# Patient Record
Sex: FEMALE | Race: WHITE | NOT HISPANIC OR LATINO | Employment: OTHER | URBAN - METROPOLITAN AREA
[De-identification: names, ages, dates, MRNs, and addresses within clinical notes are randomized per-mention and may not be internally consistent; named-entity substitution may affect disease eponyms.]

---

## 2019-08-29 ENCOUNTER — TELEPHONE (OUTPATIENT)
Dept: ENDOCRINOLOGY | Facility: CLINIC | Age: 69
End: 2019-08-29

## 2019-08-29 NOTE — TELEPHONE ENCOUNTER
S/w pt. Advised of no referral placed to endo and to check w/ insurance company to see if referral is needed or not. Verbalized understanding.

## 2019-08-29 NOTE — TELEPHONE ENCOUNTER
----- Message from Marilia Farr sent at 8/29/2019  2:19 PM CDT -----  Contact: patient  Type: Needs Medical Advice    Who Called:  patient  Symptoms (please be specific):  femi  How long has patient had these symptoms:  femi  Pharmacy name and phone #:  femi  Best Call Back Number: [phone]   Additional Information: Patient states she has apt on 9/5 @ 11 and says her insurance company does not need referral from Dr. Cedeno's office ,but patient wants it checked out before her apt. By the  office before she comes to apt. To insure everything is in place because she needs this pt.  Please call to advise. Thanks!

## 2019-09-05 ENCOUNTER — OFFICE VISIT (OUTPATIENT)
Dept: ENDOCRINOLOGY | Facility: CLINIC | Age: 69
End: 2019-09-05
Payer: MEDICARE

## 2019-09-05 VITALS
HEIGHT: 63 IN | BODY MASS INDEX: 18.75 KG/M2 | DIASTOLIC BLOOD PRESSURE: 98 MMHG | HEART RATE: 114 BPM | SYSTOLIC BLOOD PRESSURE: 146 MMHG | WEIGHT: 105.81 LBS

## 2019-09-05 DIAGNOSIS — I10 BENIGN ESSENTIAL HTN: Primary | ICD-10-CM

## 2019-09-05 PROCEDURE — 99203 OFFICE O/P NEW LOW 30 MIN: CPT | Mod: S$GLB,,, | Performed by: INTERNAL MEDICINE

## 2019-09-05 PROCEDURE — 3080F DIAST BP >= 90 MM HG: CPT | Mod: CPTII,S$GLB,, | Performed by: INTERNAL MEDICINE

## 2019-09-05 PROCEDURE — 3077F PR MOST RECENT SYSTOLIC BLOOD PRESSURE >= 140 MM HG: ICD-10-PCS | Mod: CPTII,S$GLB,, | Performed by: INTERNAL MEDICINE

## 2019-09-05 PROCEDURE — 99999 PR PBB SHADOW E&M-EST. PATIENT-LVL III: CPT | Mod: PBBFAC,,, | Performed by: INTERNAL MEDICINE

## 2019-09-05 PROCEDURE — 99999 PR PBB SHADOW E&M-EST. PATIENT-LVL III: ICD-10-PCS | Mod: PBBFAC,,, | Performed by: INTERNAL MEDICINE

## 2019-09-05 PROCEDURE — 1101F PR PT FALLS ASSESS DOC 0-1 FALLS W/OUT INJ PAST YR: ICD-10-PCS | Mod: CPTII,S$GLB,, | Performed by: INTERNAL MEDICINE

## 2019-09-05 PROCEDURE — 3080F PR MOST RECENT DIASTOLIC BLOOD PRESSURE >= 90 MM HG: ICD-10-PCS | Mod: CPTII,S$GLB,, | Performed by: INTERNAL MEDICINE

## 2019-09-05 PROCEDURE — 99203 PR OFFICE/OUTPT VISIT, NEW, LEVL III, 30-44 MIN: ICD-10-PCS | Mod: S$GLB,,, | Performed by: INTERNAL MEDICINE

## 2019-09-05 PROCEDURE — 3077F SYST BP >= 140 MM HG: CPT | Mod: CPTII,S$GLB,, | Performed by: INTERNAL MEDICINE

## 2019-09-05 PROCEDURE — 1101F PT FALLS ASSESS-DOCD LE1/YR: CPT | Mod: CPTII,S$GLB,, | Performed by: INTERNAL MEDICINE

## 2019-09-05 NOTE — PROGRESS NOTES
CHIEF COMPLAINT: HTN  69 year old being seen as a new patient. States that had HTN started in 50's. On ARB. States that she has episodes of palpitations- Seeing Cardiology. Did have a low K 5/2018. Palpitations occur sometimes daily or weekly. Occasionally does get diaphoretic. No assoc anxiety. States has gotten BP spike with these episodes. Had an episode today of palpitations.       PAST MEDICAL HISTORY/PAST SURGICAL HISTORY:  Reviewed in [facility]    SOCIAL HISTORY: No T/A    FAMILY HISTORY:  No DM. No known thyroid disease    MEDICATIONS/ALLERGIES: The patient's MedCard has been updated and reviewed.      ROS:   Constitutional: No recent significant weight change  Eyes: No recent visual changes  ENT: No dysphagia  Cardiovascular: Denies current anginal symptoms  Respiratory: Denies current respiratory difficulty  Gastrointestinal: Denies recent bowel disturbances  GenitoUrinary - No dysuria  Skin: No new skin rash  Neurologic: No focal neurologic complaints  Remainder ROS negative        PE:    GENERAL: Well developed, well nourished.  PSYCH:  appropriate mood and affect  EYES:  PERRL, EOM intact.  ENT: Nares patent, oropharynx clear, mucosa pink,   NECK: Supple, trachea midline, no palpable thyroid nodules.   CHEST: Resp even and unlabored, CTA bilateral.  CARDIAC: RRR, S1, S2 heard, no murmurs, rubs, S3, or S4  ABDOMEN: Soft, non-tender, non-distended;  No organomegaly  VASCULAR:  DP pulses +2/4 bilaterally, no edema  NEURO: Gait steady, CN II-VII grossly intact  SKIN: No areas of breakdown, no acanthosis nigracans.    LABS   Results for SAMIRA GUARDADO (MRN [id number]) as of 9/5/2019 11:19   Ref. Range 4/20/2019 12:46   Sodium Latest Ref Range: 136 - 145 mmol/L 141   Potassium Latest Ref Range: 3.5 - 5.1 mmol/L 3.6   Chloride Latest Ref Range: 95 - 110 mmol/L 106   CO2 Latest Ref Range: 22 - 31 mmol/L 29   Anion Gap Latest Ref Range: 8 - 16 mmol/L 6 (L)   BUN, Bld Latest Ref Range: 7 - 18 mg/dL 13   Creatinine Latest  Ref Range: 0.50 - 1.40 mg/dL 0.75   eGFR if non African American Latest Ref Range: >60 mL/min/1.73 m^2 >60   eGFR if  Latest Ref Range: >60 mL/min/1.73 m^2 >60   Glucose Latest Ref Range: 70 - 110 mg/dL 104   Calcium Latest Ref Range: 8.4 - 10.2 mg/dL 10.3 (H)   Magnesium Latest Ref Range: 1.6 - 2.6 mg/dL 2.2   Alkaline Phosphatase Latest Ref Range: 38 - 145 U/L 79   PROTEIN TOTAL Latest Ref Range: 6.0 - 8.4 g/dL 7.7   Albumin Latest Ref Range: 3.5 - 5.2 g/dL 4.6   BILIRUBIN TOTAL Latest Ref Range: 0.2 - 1.3 mg/dL 0.4   AST Latest Ref Range: 14 - 36 U/L 43 (H)   ALT Latest Ref Range: 10 - 44 U/L 40       Results for SAMIRA GUARDADO (MRN [id number]) as of 9/5/2019 11:19   Ref. Range 6/27/2019 13:12 6/28/2019 13:39 7/1/2019 07:10   Chromogranin A Latest Ref Range: 25 - 140 ng/mL 137     Metanephrines, 24H Ur Latest Ref Range: 90 - 315 mcg/24 h   90   Normetanephrine, 24H Ur Latest Ref Range: 122 - 676 mcg/24 h   248       ASSESSMENT/PLAN:  1. HTN- has had low K in th epast. Will do w/u as seen below. R/O hyperaldo and pheo.     FOLLOWUP ([facility])  8 AM aldosterone, Renin, CMP, metanephrines

## 2019-09-05 NOTE — LETTER
September 12, 2019      Tasha Foster MD  [address]  [address]  [place] [address]           [facility]  [address] [facility] [address] [place] [address]  Phone: [phone]  Fax: [phone]          Patient: Antonieta Hager   MR Number: [id number]   YOB: 1950   Date of Visit: 9/5/2019       Dear Dr. Tasha Foster:    Thank you for referring Antonieta Hager to me for evaluation. Attached you will find relevant portions of my assessment and plan of care.    If you have questions, please do not hesitate to call me. I look forward to following Antonieta Hager along with you.    Sincerely,    DO Erum Fosterosure  CC:  No Recipients    If you would like to receive this communication electronically, please contact [email or web address] or [phone] to request more information on [employer] access.    For providers and/or their staff who would like to refer a patient to [facility], please contact us through our one-stop-shop provider referral line, [facility], at [phone].    If you feel you have received this communication in error or would no longer like to receive these types of communications, please e-mail [email or web address]

## 2019-09-11 LAB
ALBUMIN SERPL-MCNC: 4.2 G/DL (ref 3.6–5.1)
ALBUMIN/GLOB SERPL: 1.8 (CALC) (ref 1–2.5)
ALDOST SERPL-MCNC: 5 NG/DL
ALP SERPL-CCNC: 73 U/L (ref 33–130)
ALT SERPL-CCNC: 17 U/L (ref 6–29)
AST SERPL-CCNC: 21 U/L (ref 10–35)
BILIRUB SERPL-MCNC: 0.4 MG/DL (ref 0.2–1.2)
BUN SERPL-MCNC: 12 MG/DL (ref 7–25)
BUN/CREAT SERPL: NORMAL (CALC) (ref 6–22)
CALCIUM SERPL-MCNC: 10 MG/DL (ref 8.6–10.4)
CHLORIDE SERPL-SCNC: 107 MMOL/L (ref 98–110)
CO2 SERPL-SCNC: 27 MMOL/L (ref 20–32)
CREAT SERPL-MCNC: 0.77 MG/DL (ref 0.5–0.99)
GFRSERPLBLD MDRD-ARVRAT: 79 ML/MIN/1.73M2
GLOBULIN SER CALC-MCNC: 2.3 G/DL (CALC) (ref 1.9–3.7)
GLUCOSE SERPL-MCNC: 94 MG/DL (ref 65–99)
METANEPH FREE SERPL-MCNC: 34 PG/ML
METANEPHS SERPL-MCNC: 168 PG/ML
NORMETANEPH FREE SERPL-MCNC: 134 PG/ML
POTASSIUM SERPL-SCNC: 4.8 MMOL/L (ref 3.5–5.3)
PROT SERPL-MCNC: 6.5 G/DL (ref 6.1–8.1)
RENIN PLAS-CCNC: 4.01 NG/ML/H (ref 0.25–5.82)
SODIUM SERPL-SCNC: 141 MMOL/L (ref 135–146)

## 2019-09-12 ENCOUNTER — TELEPHONE (OUTPATIENT)
Dept: ENDOCRINOLOGY | Facility: CLINIC | Age: 69
End: 2019-09-12

## 2019-09-12 DIAGNOSIS — R23.2 FLUSHING: Primary | ICD-10-CM

## 2019-09-12 NOTE — TELEPHONE ENCOUNTER
Spoke to pt and adv of Dr Cedeno's previous message, voiced understanding. Changed order to [facility] per pt req.

## 2019-09-12 NOTE — TELEPHONE ENCOUNTER
----- Message from Marilia Farr sent at 9/12/2019  4:08 PM CDT -----  Contact: patient  Type:  Patient Returning Call    Who Called:  murali  Who Left Message for Patient:  shira  Does the patient know what this is regarding?:  yes  Best Call Back Number:  [phone] (home)   Additional Information:  na

## 2019-09-12 NOTE — TELEPHONE ENCOUNTER
"Pt verbalized understanding of htn not related to adrenal.    Reports palpitations, facial flushing, hot flashes "feels hormonal" - pt postmenopausal    Confused about symptoms  Asking where should she go from here?  "

## 2019-09-12 NOTE — TELEPHONE ENCOUNTER
----- Message from Marj Keyes sent at 9/12/2019  2:33 PM CDT -----  Contact: patient  Type: Needs Medical Advice    Who Called:  Patient  Best Call Back Number: [phone] (home)   Additional Information: calling about getting the results of her test that was done Friday would like a call back

## 2019-09-24 LAB
5OH-INDOLEACETATE 24H UR-MRATE: 3.2 MG/24 H
CREAT 24H UR-MRATE: 0.97 G/24 H (ref 0.5–2.15)
SPECIMEN VOL 24H UR: 2350 ML

## 2019-09-25 ENCOUNTER — TELEPHONE (OUTPATIENT)
Dept: ENDOCRINOLOGY | Facility: CLINIC | Age: 69
End: 2019-09-25

## 2019-09-25 NOTE — TELEPHONE ENCOUNTER
----- Message from Charlene Nguyen sent at 9/25/2019  2:53 PM CDT -----  Contact: Antonieta  Type:  Test Results    Who Called:  patient  Name of Test (Lab/Mammo/Etc):  24 hr UA  Date of Test:  Last week  Ordering Provider:  Wilian  Where the test was performed:  [facility] Call Back Number:  [phone] (home)   Additional Information:  Please advise--thank you

## 2019-09-26 NOTE — TELEPHONE ENCOUNTER
Let her know that all her tests are normal. Does not appear to be an endocrine source for elevated BP. We checked for several adrenal disorders

## 2019-09-27 NOTE — TELEPHONE ENCOUNTER
Pt advised and verbalized understanding.  States she will check with her GYN regarding the hot flashes she's been having.

## 2019-11-04 PROBLEM — R94.39 ABNORMAL STRESS TEST: Status: ACTIVE | Noted: 2019-11-04

## 2019-11-04 PROBLEM — I10 ESSENTIAL HYPERTENSION: Status: ACTIVE | Noted: 2019-11-04

## 2020-07-09 PROBLEM — M81.0 AGE-RELATED OSTEOPOROSIS WITHOUT CURRENT PATHOLOGICAL FRACTURE: Status: ACTIVE | Noted: 2020-07-09

## 2020-07-09 PROBLEM — J45.40 MODERATE PERSISTENT ASTHMA WITHOUT COMPLICATION: Status: ACTIVE | Noted: 2020-07-09

## 2020-07-09 PROBLEM — I47.10 SVT (SUPRAVENTRICULAR TACHYCARDIA): Status: ACTIVE | Noted: 2020-07-09

## 2021-12-16 PROBLEM — R31.21 ASYMPTOMATIC MICROSCOPIC HEMATURIA: Status: ACTIVE | Noted: 2021-12-16

## 2021-12-16 PROBLEM — Z91.89 FRAMINGHAM CARDIAC RISK 10-20% IN NEXT 10 YEARS: Status: ACTIVE | Noted: 2021-12-16

## 2021-12-20 ENCOUNTER — TELEPHONE (OUTPATIENT)
Dept: UROLOGY | Facility: CLINIC | Age: 71
End: 2021-12-20
Payer: MEDICARE

## 2023-03-02 PROBLEM — J44.9 CHRONIC OBSTRUCTIVE PULMONARY DISEASE, UNSPECIFIED COPD TYPE: Status: ACTIVE | Noted: 2023-03-02

## 2023-09-25 ENCOUNTER — TELEPHONE (OUTPATIENT)
Dept: ENDOCRINOLOGY | Facility: CLINIC | Age: 73
End: 2023-09-25
Payer: MEDICARE

## 2023-09-25 NOTE — TELEPHONE ENCOUNTER
S/w pt notified her nothing available w/Dr Cedeno. Will pamella to put her ion wait list or look in main campus or [facility] or outside [facility]. Pt asked for Outside [facility] provider. Dr Gallego's number given.Pt verb understanding

## 2023-09-25 NOTE — TELEPHONE ENCOUNTER
----- Message from Melisa Estrada sent at 9/25/2023  9:13 AM CDT -----  Type:  Sooner Appointment Request    Caller is requesting a sooner appointment.  Caller declined first available appointment listed below.  Caller will not accept being placed on the waitlist and is requesting a message be sent to doctor.    Name of Caller:pt    When is the first available appointment?na      Would the patient rather a call back or a response via [facility]? Call back    Best Call Back Number:[phone]  Please leave detailed vm    Additional Information: saw allergist and they did blood work. And her thyroid levels came back low showing possible hypothyroid's. She has seen Dr Cedeno in the past and would like too see him again if possible if not anyone will do.     Please call Back to advise. Thanks!

## 2023-10-02 ENCOUNTER — TELEPHONE (OUTPATIENT)
Dept: ENDOCRINOLOGY | Facility: CLINIC | Age: 73
End: 2023-10-02
Payer: MEDICARE

## 2023-10-02 NOTE — TELEPHONE ENCOUNTER
S/w pt. Advised Dr. Cedeno has wait list for new patients 8-12 months long. Added to [facility] and [facility] endocrine wait lists. Verbalized understanding.

## 2023-10-02 NOTE — TELEPHONE ENCOUNTER
----- Message from Carmen Mckeon sent at 10/2/2023  7:53 AM CDT -----  Regarding: appointment  Contact: patient  Type:  Sooner Appointment Request    Caller is requesting a sooner appointment.  Caller declined first available appointment listed below.  Caller will not accept being placed on the waitlist and is requesting a message be sent to doctor.    Name of Caller:  patient  When is the first available appointment?    Symptoms:  check thyroid levels  Best Call Back Number:  [phone] (home)   Additional Information:  Patient states her allergies would like her levels checked and for the endocrinologist to follow her. Please call patient to advise.Thanks!

## 2023-10-20 PROBLEM — R79.89 LOW TSH LEVEL: Status: ACTIVE | Noted: 2023-10-20

## 2024-02-12 PROBLEM — J45.50 SEVERE PERSISTENT ASTHMA WITHOUT COMPLICATION: Status: ACTIVE | Noted: 2024-02-12
